# Patient Record
Sex: FEMALE | Race: WHITE | Employment: FULL TIME | URBAN - METROPOLITAN AREA
[De-identification: names, ages, dates, MRNs, and addresses within clinical notes are randomized per-mention and may not be internally consistent; named-entity substitution may affect disease eponyms.]

---

## 2018-05-02 LAB — CHLAMYDIA TRACHOMATIS AMPLIFIED DET: NEGATIVE

## 2018-06-01 LAB
ABO/RH: NORMAL
ANTIBODY SCREEN: NEGATIVE
HCT VFR BLD CALC: 38 % (ref 36–46)
HEMOGLOBIN: 13 G/DL (ref 12–16)
HEPATITIS B SURFACE ANTIGEN INTERPRETATION: NEGATIVE
HIV-1 AND HIV-2 ANTIBODIES: NEGATIVE
RPR: NON REACTIVE
RUBELLA ANTIBODY IGG: NORMAL

## 2018-09-19 LAB
GLUCOSE CHALLENGE: 76
HCT: 38.8 %
HGB: 13.1

## 2018-12-20 ENCOUNTER — ANESTHESIA (OUTPATIENT)
Dept: LABOR AND DELIVERY | Age: 28
End: 2018-12-20
Payer: COMMERCIAL

## 2018-12-20 ENCOUNTER — HOSPITAL ENCOUNTER (INPATIENT)
Age: 28
LOS: 2 days | Discharge: HOME | End: 2018-12-22
Attending: OBSTETRICS & GYNECOLOGY | Admitting: OBSTETRICS & GYNECOLOGY
Payer: COMMERCIAL

## 2018-12-20 ENCOUNTER — ANESTHESIA EVENT (OUTPATIENT)
Dept: LABOR AND DELIVERY | Age: 28
End: 2018-12-20
Payer: COMMERCIAL

## 2018-12-20 PROBLEM — O47.9 THREATENED LABOR: Status: ACTIVE | Noted: 2018-12-20

## 2018-12-20 PROBLEM — Z37.9 NORMAL LABOR: Status: ACTIVE | Noted: 2018-12-20

## 2018-12-20 LAB
ABO/RH: NORMAL
AMPHETAMINE SCREEN, URINE: NORMAL
ANTIBODY SCREEN: NORMAL
BARBITURATE SCREEN URINE: NORMAL
BASOPHILS ABSOLUTE: 0.1 K/UL (ref 0–0.2)
BASOPHILS RELATIVE PERCENT: 0.3 %
BENZODIAZEPINE SCREEN, URINE: NORMAL
BUPRENORPHINE URINE: NORMAL
CANNABINOID SCREEN URINE: NORMAL
COCAINE METABOLITE SCREEN URINE: NORMAL
EOSINOPHILS ABSOLUTE: 0 K/UL (ref 0–0.6)
EOSINOPHILS RELATIVE PERCENT: 0.1 %
HCT VFR BLD CALC: 40.4 % (ref 36–48)
HEMOGLOBIN: 13.5 G/DL (ref 12–16)
LYMPHOCYTES ABSOLUTE: 1.3 K/UL (ref 1–5.1)
LYMPHOCYTES RELATIVE PERCENT: 8.7 %
Lab: NORMAL
MCH RBC QN AUTO: 28.8 PG (ref 26–34)
MCHC RBC AUTO-ENTMCNC: 33.4 G/DL (ref 31–36)
MCV RBC AUTO: 86.1 FL (ref 80–100)
METHADONE SCREEN, URINE: NORMAL
MONOCYTES ABSOLUTE: 0.9 K/UL (ref 0–1.3)
MONOCYTES RELATIVE PERCENT: 5.7 %
NEUTROPHILS ABSOLUTE: 12.8 K/UL (ref 1.7–7.7)
NEUTROPHILS RELATIVE PERCENT: 85.2 %
OPIATE SCREEN URINE: NORMAL
OXYCODONE URINE: NORMAL
PDW BLD-RTO: 14 % (ref 12.4–15.4)
PH UA: 7
PHENCYCLIDINE SCREEN URINE: NORMAL
PLATELET # BLD: 197 K/UL (ref 135–450)
PMV BLD AUTO: 8.3 FL (ref 5–10.5)
PROPOXYPHENE SCREEN: NORMAL
RBC # BLD: 4.69 M/UL (ref 4–5.2)
WBC # BLD: 15 K/UL (ref 4–11)

## 2018-12-20 PROCEDURE — 86780 TREPONEMA PALLIDUM: CPT

## 2018-12-20 PROCEDURE — 2580000003 HC RX 258: Performed by: ADVANCED PRACTICE MIDWIFE

## 2018-12-20 PROCEDURE — 85025 COMPLETE CBC W/AUTO DIFF WBC: CPT

## 2018-12-20 PROCEDURE — 1220000000 HC SEMI PRIVATE OB R&B

## 2018-12-20 PROCEDURE — 86850 RBC ANTIBODY SCREEN: CPT

## 2018-12-20 PROCEDURE — 7200000001 HC VAGINAL DELIVERY

## 2018-12-20 PROCEDURE — 6370000000 HC RX 637 (ALT 250 FOR IP): Performed by: ADVANCED PRACTICE MIDWIFE

## 2018-12-20 PROCEDURE — 2580000003 HC RX 258

## 2018-12-20 PROCEDURE — 2500000003 HC RX 250 WO HCPCS: Performed by: NURSE ANESTHETIST, CERTIFIED REGISTERED

## 2018-12-20 PROCEDURE — 86900 BLOOD TYPING SEROLOGIC ABO: CPT

## 2018-12-20 PROCEDURE — 3700000025 ANESTHESIA EPIDURAL BLOCK: Performed by: ANESTHESIOLOGY

## 2018-12-20 PROCEDURE — 51701 INSERT BLADDER CATHETER: CPT

## 2018-12-20 PROCEDURE — 80307 DRUG TEST PRSMV CHEM ANLYZR: CPT

## 2018-12-20 PROCEDURE — 86901 BLOOD TYPING SEROLOGIC RH(D): CPT

## 2018-12-20 RX ORDER — ONDANSETRON 2 MG/ML
4 INJECTION INTRAMUSCULAR; INTRAVENOUS EVERY 6 HOURS PRN
Status: DISCONTINUED | OUTPATIENT
Start: 2018-12-20 | End: 2018-12-20

## 2018-12-20 RX ORDER — SODIUM CHLORIDE, SODIUM LACTATE, POTASSIUM CHLORIDE, CALCIUM CHLORIDE 600; 310; 30; 20 MG/100ML; MG/100ML; MG/100ML; MG/100ML
INJECTION, SOLUTION INTRAVENOUS CONTINUOUS
Status: DISCONTINUED | OUTPATIENT
Start: 2018-12-20 | End: 2018-12-20

## 2018-12-20 RX ORDER — BUPIVACAINE HYDROCHLORIDE 5 MG/ML
INJECTION, SOLUTION EPIDURAL; INTRACAUDAL PRN
Status: DISCONTINUED | OUTPATIENT
Start: 2018-12-20 | End: 2018-12-20 | Stop reason: SDUPTHER

## 2018-12-20 RX ORDER — ACETAMINOPHEN 325 MG/1
650 TABLET ORAL EVERY 4 HOURS PRN
Status: DISCONTINUED | OUTPATIENT
Start: 2018-12-20 | End: 2018-12-20

## 2018-12-20 RX ORDER — BUPIVACAINE HYDROCHLORIDE 2.5 MG/ML
INJECTION, SOLUTION EPIDURAL; INFILTRATION; INTRACAUDAL PRN
Status: DISCONTINUED | OUTPATIENT
Start: 2018-12-20 | End: 2018-12-20 | Stop reason: SDUPTHER

## 2018-12-20 RX ORDER — SODIUM CHLORIDE 0.9 % (FLUSH) 0.9 %
10 SYRINGE (ML) INJECTION PRN
Status: DISCONTINUED | OUTPATIENT
Start: 2018-12-20 | End: 2018-12-22 | Stop reason: HOSPADM

## 2018-12-20 RX ORDER — HYDROCODONE BITARTRATE AND ACETAMINOPHEN 5; 325 MG/1; MG/1
1 TABLET ORAL EVERY 6 HOURS PRN
Status: DISCONTINUED | OUTPATIENT
Start: 2018-12-20 | End: 2018-12-22 | Stop reason: HOSPADM

## 2018-12-20 RX ORDER — LIDOCAINE HYDROCHLORIDE 10 MG/ML
30 INJECTION, SOLUTION EPIDURAL; INFILTRATION; INTRACAUDAL; PERINEURAL PRN
Status: DISCONTINUED | OUTPATIENT
Start: 2018-12-20 | End: 2018-12-20

## 2018-12-20 RX ORDER — DOCUSATE SODIUM 100 MG/1
100 CAPSULE, LIQUID FILLED ORAL 2 TIMES DAILY
Status: DISCONTINUED | OUTPATIENT
Start: 2018-12-20 | End: 2018-12-22 | Stop reason: HOSPADM

## 2018-12-20 RX ORDER — SODIUM CHLORIDE 0.9 % (FLUSH) 0.9 %
10 SYRINGE (ML) INJECTION EVERY 12 HOURS SCHEDULED
Status: DISCONTINUED | OUTPATIENT
Start: 2018-12-20 | End: 2018-12-20

## 2018-12-20 RX ORDER — DIPHENHYDRAMINE HCL 25 MG
25 TABLET ORAL EVERY 4 HOURS PRN
Status: DISCONTINUED | OUTPATIENT
Start: 2018-12-20 | End: 2018-12-20

## 2018-12-20 RX ORDER — IBUPROFEN 800 MG/1
800 TABLET ORAL EVERY 6 HOURS PRN
Status: DISCONTINUED | OUTPATIENT
Start: 2018-12-20 | End: 2018-12-22 | Stop reason: HOSPADM

## 2018-12-20 RX ORDER — SODIUM CHLORIDE 0.9 % (FLUSH) 0.9 %
10 SYRINGE (ML) INJECTION EVERY 12 HOURS SCHEDULED
Status: DISCONTINUED | OUTPATIENT
Start: 2018-12-20 | End: 2018-12-22 | Stop reason: HOSPADM

## 2018-12-20 RX ORDER — SODIUM CHLORIDE, SODIUM LACTATE, POTASSIUM CHLORIDE, CALCIUM CHLORIDE 600; 310; 30; 20 MG/100ML; MG/100ML; MG/100ML; MG/100ML
INJECTION, SOLUTION INTRAVENOUS
Status: COMPLETED
Start: 2018-12-20 | End: 2018-12-20

## 2018-12-20 RX ORDER — ACETAMINOPHEN 325 MG/1
650 TABLET ORAL EVERY 4 HOURS PRN
Status: DISCONTINUED | OUTPATIENT
Start: 2018-12-20 | End: 2018-12-22 | Stop reason: HOSPADM

## 2018-12-20 RX ORDER — SODIUM CHLORIDE 0.9 % (FLUSH) 0.9 %
10 SYRINGE (ML) INJECTION PRN
Status: DISCONTINUED | OUTPATIENT
Start: 2018-12-20 | End: 2018-12-20

## 2018-12-20 RX ORDER — ACETAMINOPHEN 325 MG/1
650 TABLET ORAL EVERY 6 HOURS PRN
COMMUNITY

## 2018-12-20 RX ADMIN — BENZOCAINE AND LEVOMENTHOL: 200; 5 SPRAY TOPICAL at 20:10

## 2018-12-20 RX ADMIN — BUPIVACAINE HYDROCHLORIDE 3 ML: 2.5 INJECTION, SOLUTION EPIDURAL; INFILTRATION; INTRACAUDAL; PERINEURAL at 11:37

## 2018-12-20 RX ADMIN — IBUPROFEN 800 MG: 800 TABLET ORAL at 20:10

## 2018-12-20 RX ADMIN — WITCH HAZEL 40 EACH: 500 SOLUTION RECTAL; TOPICAL at 20:10

## 2018-12-20 RX ADMIN — BUPIVACAINE HYDROCHLORIDE 3 ML: 5 INJECTION, SOLUTION EPIDURAL; INTRACAUDAL; PERINEURAL at 11:37

## 2018-12-20 RX ADMIN — SODIUM CHLORIDE, POTASSIUM CHLORIDE, SODIUM LACTATE AND CALCIUM CHLORIDE: 600; 310; 30; 20 INJECTION, SOLUTION INTRAVENOUS at 11:30

## 2018-12-20 RX ADMIN — HYDROCODONE BITARTRATE AND ACETAMINOPHEN 1 TABLET: 5; 325 TABLET ORAL at 22:15

## 2018-12-20 RX ADMIN — Medication 15 ML/HR: at 11:43

## 2018-12-20 RX ADMIN — SODIUM CHLORIDE, POTASSIUM CHLORIDE, SODIUM LACTATE AND CALCIUM CHLORIDE 1000 ML: 600; 310; 30; 20 INJECTION, SOLUTION INTRAVENOUS at 10:30

## 2018-12-20 RX ADMIN — DOCUSATE SODIUM 100 MG: 100 CAPSULE, LIQUID FILLED ORAL at 20:10

## 2018-12-20 ASSESSMENT — PAIN SCALES - GENERAL
PAINLEVEL_OUTOF10: 5
PAINLEVEL_OUTOF10: 6

## 2018-12-20 NOTE — H&P
Department of Obstetrics and Gynecology  [facility] Obstetrics History and Physical        CHIEF COMPLAINT:  contractions    HISTORY OF PRESENT ILLNESS:      The patient is a   1 parity 0 at 38.5 weeks. Patient presents with a chief complaint as above and is being admitted for active phase labor.  present. Requested epidural and just received with relief. DATES:    Estimated Due Date:  18    PRENATAL CARE:    Provider:  Ayah PLUNKETT    Group B Strep:  neg      Past Gynecological History:      See prenatal    Past Medical History:    History reviewed. No pertinent past medical history.   Past Surgical History:        Procedure Laterality Date    SINUS SURGERY      TONSILLECTOMY       Social History:      Family History:   Family History   Problem Relation Age of Onset    Heart Disease Mother     High Cholesterol Mother     Hearing Loss Maternal Grandmother     High Cholesterol Maternal Grandmother     Heart Disease Maternal Grandmother     Cancer Paternal Grandmother         ovarian     Medications Prior to Admission:    Allergies:  NKA    PHYSICAL EXAM:    General appearance:  Awake, alert  Neurologic:  oriented  Lungs:  Breathing unlabored  Heart:    Abdomen:  gravid  Fetal heart rate:  Baseline Heart Rate 135, accelerations:  present  Pelvis:  adequate  Cervix:    DILATION:  5 cm  EFFACEMENT:   100%  STATION:  0 cm  CONSISTENCY:  soft  POSITION:  anterior      Contraction frequency:  2-3 minutes  Membranes:  Intact    ASSESSMENT AND PLAN:    A: 34 yo   1 parity 0 at 38.5 weeks   Active labor   FHR category 1    P: Expectant management   Dr. Goodson Proffer aware   Reevaluate pedro luis Khanna

## 2018-12-20 NOTE — ANESTHESIA PRE PROCEDURE
Facility-Administered Medications Ordered in Other Encounters   Medication Dose Route Frequency Provider Last Rate Last Dose    bupivacaine (PF) (MARCAINE) 0.25 % injection    PRN Mauro Stoddard APRN - CRNA   3 mL at 12/20/18 1137    bupivacaine (PF) (MARCAINE) 0.5 % injection    PRN Mauro Stoddard APRN - CRNA   3 mL at 12/20/18 1137    sodium chloride 0.9 % 200 mL with fentaNYL 500 mcg, bupivacaine 0.5% 50 mL (OB) epidural    Continuous PRN Mauro Stoddard APRN - CRNA 15 mL/hr at 12/20/18 1143 15 mL/hr at 12/20/18 1143       Allergies:  No Known Allergies    Problem List:    Patient Active Problem List   Diagnosis Code    Threatened labor O47.9       Past Medical History:  History reviewed. No pertinent past medical history. Past Surgical History:        Procedure Laterality Date    SINUS SURGERY      TONSILLECTOMY         Social History:    Social History   Substance Use Topics    Smoking status: Never Smoker    Smokeless tobacco: Never Used    Alcohol use No                                Counseling given: Not Answered      Vital Signs (Current):   Vitals:    12/20/18 1138 12/20/18 1140 12/20/18 1143 12/20/18 1147   BP: 117/68 124/77 114/66 109/65   Pulse: 74 88 75 87   Temp:       TempSrc:       Weight:       Height:                                                  BP Readings from Last 3 Encounters:   12/20/18 109/65   12/10/14 115/79   10/05/11 133/79       NPO Status: Time of last liquid consumption: 0900 (water)                        Time of last solid consumption: 2200 (toast)                        Date of last liquid consumption: 12/20/18                        Date of last solid food consumption: 12/19/18    BMI:   Wt Readings from Last 3 Encounters:   12/20/18 191 lb (86.6 kg)   12/10/14 150 lb (68 kg)   10/05/11 145 lb (65.8 kg)     Body mass index is 31.78 kg/m².     CBC:   Lab Results   Component Value Date    WBC 15.0 12/20/2018    RBC 4.69 12/20/2018    HGB 13.5 12/20/2018

## 2018-12-21 LAB
BASOPHILS ABSOLUTE: 0 K/UL (ref 0–0.2)
BASOPHILS RELATIVE PERCENT: 0.2 %
EOSINOPHILS ABSOLUTE: 0.1 K/UL (ref 0–0.6)
EOSINOPHILS RELATIVE PERCENT: 0.5 %
HCT VFR BLD CALC: 33.5 % (ref 36–48)
HEMOGLOBIN: 11.2 G/DL (ref 12–16)
LYMPHOCYTES ABSOLUTE: 1.6 K/UL (ref 1–5.1)
LYMPHOCYTES RELATIVE PERCENT: 10.2 %
MCH RBC QN AUTO: 29 PG (ref 26–34)
MCHC RBC AUTO-ENTMCNC: 33.5 G/DL (ref 31–36)
MCV RBC AUTO: 86.7 FL (ref 80–100)
MONOCYTES ABSOLUTE: 0.9 K/UL (ref 0–1.3)
MONOCYTES RELATIVE PERCENT: 5.4 %
NEUTROPHILS ABSOLUTE: 13.5 K/UL (ref 1.7–7.7)
NEUTROPHILS RELATIVE PERCENT: 83.7 %
PDW BLD-RTO: 14.2 % (ref 12.4–15.4)
PLATELET # BLD: 195 K/UL (ref 135–450)
PMV BLD AUTO: 7.9 FL (ref 5–10.5)
RBC # BLD: 3.87 M/UL (ref 4–5.2)
TOTAL SYPHILLIS IGG/IGM: NORMAL
WBC # BLD: 16.2 K/UL (ref 4–11)

## 2018-12-21 PROCEDURE — 36415 COLL VENOUS BLD VENIPUNCTURE: CPT

## 2018-12-21 PROCEDURE — 85025 COMPLETE CBC W/AUTO DIFF WBC: CPT

## 2018-12-21 PROCEDURE — 6370000000 HC RX 637 (ALT 250 FOR IP): Performed by: ADVANCED PRACTICE MIDWIFE

## 2018-12-21 PROCEDURE — 1220000000 HC SEMI PRIVATE OB R&B

## 2018-12-21 RX ADMIN — DOCUSATE SODIUM 100 MG: 100 CAPSULE, LIQUID FILLED ORAL at 09:36

## 2018-12-21 RX ADMIN — DOCUSATE SODIUM 100 MG: 100 CAPSULE, LIQUID FILLED ORAL at 21:52

## 2018-12-21 RX ADMIN — IBUPROFEN 800 MG: 800 TABLET ORAL at 04:28

## 2018-12-21 RX ADMIN — IBUPROFEN 800 MG: 800 TABLET ORAL at 11:49

## 2018-12-21 RX ADMIN — HYDROCODONE BITARTRATE AND ACETAMINOPHEN 1 TABLET: 5; 325 TABLET ORAL at 21:39

## 2018-12-21 RX ADMIN — HYDROCODONE BITARTRATE AND ACETAMINOPHEN 1 TABLET: 5; 325 TABLET ORAL at 09:36

## 2018-12-21 RX ADMIN — IBUPROFEN 800 MG: 800 TABLET ORAL at 18:04

## 2018-12-21 RX ADMIN — HYDROCODONE BITARTRATE AND ACETAMINOPHEN 1 TABLET: 5; 325 TABLET ORAL at 15:12

## 2018-12-21 ASSESSMENT — PAIN SCALES - GENERAL
PAINLEVEL_OUTOF10: 3
PAINLEVEL_OUTOF10: 6
PAINLEVEL_OUTOF10: 4
PAINLEVEL_OUTOF10: 6
PAINLEVEL_OUTOF10: 7
PAINLEVEL_OUTOF10: 7

## 2018-12-21 NOTE — L&D DELIVERY SUMMARY NOTE
and grandmother entered the postpartum period in  excellent condition. Dr. Lesli Tinajero notified of delivery.         Casey Garcia CNM    D: 12/20/2018 18:22:49       T: 12/20/2018 19:49:32     [email or web address]  Job#: [id number]     Doc#: [id number]    CC:

## 2018-12-21 NOTE — PLAN OF CARE
Problem: VAGINAL DELIVERY - RECOVERY AND POST PARTUM  Goal: Vital signs are medically acceptable  Outcome: Ongoing    Goal: Patient will remain free of falls  Outcome: Ongoing    Goal: Fundus firm at midline  Outcome: Ongoing    Goal: Moderate rubra without clots, no purulent discharge, no foul smelling lochia  Outcome: Ongoing    Goal: Empties bladder  Outcome: Ongoing    Goal: Verbalizes understanding of normal bowel function resumption  Outcome: Ongoing    Goal: Edema will be absent or minimal  Outcome: Ongoing    Goal: Breasts are soft with nipple integrity intact  Outcome: Ongoing    Goal: Demonstrates appropriate breast feeding techniques  Outcome: Ongoing    Goal: Appropriate behavior observed  Outcome: Ongoing    Goal: Positive Mother-Baby interactions are observed  Outcome: Ongoing    Goal: Perineum intact without discharge or hematoma  Outcome: Ongoing    Goal: Ambulates independently  Outcome: Ongoing      Problem: PAIN  Goal: Patient's pain/discomfort is manageable  Outcome: Ongoing      Problem: KNOWLEDGE DEFICIT  Goal: Patient/S.O. demonstrates understanding of disease process, treatment plan, medications, and discharge instructions.   Outcome: Ongoing

## 2018-12-21 NOTE — ANESTHESIA POSTPROCEDURE EVALUATION
Department of Anesthesiology  Postprocedure Note    Patient: Arlin Gutierrez  MRN: [id number]  YOB: 1990  Date of evaluation: 12/21/2018  Time:  7:50 AM     Procedure Summary     Date:  12/20/18 Room / Location:      Anesthesia Start:  1121 Anesthesia Stop:  1744    Procedure:  ANESTHESIA LABOR ANALGESIA Diagnosis:      Scheduled Providers:   Responsible Provider:  Evon Marmolejo MD    Anesthesia Type:  epidural ASA Status:  2 - Emergent          Anesthesia Type: No value filed. Acacia Phase I: Acacia Score: 10    Acacia Phase II: Acacia Score: 10    Last vitals: Reviewed and per EMR flowsheets. Anesthesia Post Evaluation    Level of consciousness: awake and alert  Nausea & Vomiting: no nausea and no vomiting  Complications: no  Cardiovascular status: hemodynamically stable  Respiratory status: acceptable and room air  Hydration status: stable  Comments: Patient is s/p vaginal delivery with epidural analgesia. No apparent or reported anesthesia complications thus far.

## 2018-12-22 VITALS
HEART RATE: 68 BPM | HEIGHT: 65 IN | DIASTOLIC BLOOD PRESSURE: 70 MMHG | WEIGHT: 191 LBS | BODY MASS INDEX: 31.82 KG/M2 | RESPIRATION RATE: 16 BRPM | TEMPERATURE: 98.2 F | SYSTOLIC BLOOD PRESSURE: 105 MMHG

## 2018-12-22 PROBLEM — O47.9 THREATENED LABOR: Status: RESOLVED | Noted: 2018-12-20 | Resolved: 2018-12-22

## 2018-12-22 PROBLEM — Z37.9 NORMAL LABOR: Status: RESOLVED | Noted: 2018-12-20 | Resolved: 2018-12-22

## 2018-12-22 PROCEDURE — 6370000000 HC RX 637 (ALT 250 FOR IP): Performed by: ADVANCED PRACTICE MIDWIFE

## 2018-12-22 RX ORDER — PSEUDOEPHEDRINE HCL 30 MG
100 TABLET ORAL 2 TIMES DAILY
Status: ON HOLD | COMMUNITY
Start: 2018-12-22 | End: 2020-01-10 | Stop reason: HOSPADM

## 2018-12-22 RX ORDER — HYDROCODONE BITARTRATE AND ACETAMINOPHEN 5; 325 MG/1; MG/1
1 TABLET ORAL EVERY 6 HOURS PRN
Qty: 15 TABLET | Refills: 0 | Status: SHIPPED | OUTPATIENT
Start: 2018-12-22 | End: 2018-12-25

## 2018-12-22 RX ORDER — IBUPROFEN 800 MG/1
800 TABLET ORAL EVERY 6 HOURS PRN
Qty: 60 TABLET | Refills: 0 | Status: ON HOLD | OUTPATIENT
Start: 2018-12-22 | End: 2020-01-10 | Stop reason: HOSPADM

## 2018-12-22 RX ADMIN — HYDROCODONE BITARTRATE AND ACETAMINOPHEN 1 TABLET: 5; 325 TABLET ORAL at 04:13

## 2018-12-22 RX ADMIN — IBUPROFEN 800 MG: 800 TABLET ORAL at 00:48

## 2018-12-22 RX ADMIN — WITCH HAZEL 40 EACH: 500 SOLUTION RECTAL; TOPICAL at 00:48

## 2018-12-22 RX ADMIN — IBUPROFEN 800 MG: 800 TABLET ORAL at 07:26

## 2018-12-22 RX ADMIN — DOCUSATE SODIUM 100 MG: 100 CAPSULE, LIQUID FILLED ORAL at 07:26

## 2018-12-22 RX ADMIN — HYDROCODONE BITARTRATE AND ACETAMINOPHEN 1 TABLET: 5; 325 TABLET ORAL at 10:57

## 2018-12-22 ASSESSMENT — PAIN SCALES - GENERAL
PAINLEVEL_OUTOF10: 6
PAINLEVEL_OUTOF10: 7
PAINLEVEL_OUTOF10: 4
PAINLEVEL_OUTOF10: 7

## 2018-12-22 NOTE — LACTATION NOTE
This note was copied from a baby's chart. Introduced self to patient as Lactation RN, name and phone number written on white board in room. Assisted mother with latching infant to left breast in cross-cradle hold, emphasized the importance of positioning and obtaining a deep latch. Infant observed with HENOK, SRS and AS. Breastfeeding education initiated. Reviewed with mother what to expect over the next  24-48 hours with infant feedings, infant output, and breast care. Educated mother on how to hand express colostrum. Reviewed infant feeding cues and encouraged mother to allow infant to breast feed on demand, a minimum of 8-12 times a day after the first day of life. Also encouraged mother to avoid giving infant a pacifier or bottle for at least the first two weeks of life. Binder and breast feeding log reviewed, all questions answered. Mother instructed to call Lactation nurse for F/U care as needed.
This note was copied from a baby's chart. Lactation Progress Note      Data:     Primip breast feeder requests f/u to assess latch. C/o sore nipples. Observed positioning baby to the breast, baby in chin to chest balled up position in football hold, father holding mom's breast and assisting with latching baby on. Action: Assisted with repositioning of pillows and baby to the breast. Offering more pillows for additional support of LGA baby. Reviewed rationale of baby's position, encouraging belly to belly and nose to nipple position. Shown tips for mom to provide breast support easily independently encouraging U or C shape hold on the breast while baby in football positioning. Shown mom where to hold baby supporting baby's neck, allowing the forehead to lag back and chin to latch deeply on the breast. Shown where baby should latch on to areola and breast for deep asymmetric latch. Encouraged to wait for baby to open mouth widely before bringing baby deeply onto the breast. Gave tips to encourage wide open mouth and deep latch. HENOK observed with SRS and AS. Pt confirms much relief that latch and position is much more comfortable and easier to do independently without dad's help. Good feeding observed. Baby asleep and releases the breast. Nipple remains round without blanching, creasing, or redness. Shown to mom, giving further reassurance of good latch. Educated on signs of good latch, vs shallow latch and how to break latch, encouraging to do so as needed. Discussed that baby will likely cluster feed later this evening, educating on what to expect. Encouraged parents to rest while baby is sleeping. Educated on importance and normalcy of cluster feeding, and tips to encourage breastfeeding success and rest when able. Encouraged to call for f/u support and assistance with latching/breastfeeding as needed. Lanolin and hydrogels pads provided, instructed on care of sore nipples.      Response: Verbalized
This note was copied from a baby's chart. Lactation Progress Note      Data:    Mother called because she was having trouble getting infant latched. Action: Assisted mother with getting infant placed in football hold and latched. Infant is large and mother is having trouble with keeping a good hold of him and getting him to latch. Once latched, infant does well with maintaining the latch. Worked with mother on providing good breast and head support. Encouraged mother to call for f/u assistance. Response: Mother was happy that infant had a good feeding.
has [facility] f/u outpatient clinic if needed as well.

## 2018-12-22 NOTE — PROGRESS NOTES
Department of Obstetrics and Gynecology  Labor and Delivery  Attending Post Partum Progress Note      SUBJECTIVE:  Feels well and ready for discharge today. Minimal perineal pain and moderate pain in coccyx area. Pain is well controlled w/ comfort measures and pain meds. Tolerating regular diet, ambulating well, voiding qs. Baby is nursing well. OBJECTIVE:      Vitals:  /70   Pulse 68   Temp 98.2 °F (36.8 °C) (Oral)   Resp 16   Ht 5' 5\" (1.651 m)   Wt 191 lb (86.6 kg)   LMP 03/24/2018   Breastfeeding?  Unknown   BMI 31.78 kg/m²     ABDOMEN:  soft and non-tender, [email or web address]  GENITAL/URINARY:  SLR, sutures intact, no edema/bruising  LE: No evidence of DVT    DATA:    CBC:    Lab Results   Component Value Date    WBC 16.2 12/21/2018    RBC 3.87 12/21/2018    HGB 11.2 12/21/2018    HCT 33.5 12/21/2018    MCV 86.7 12/21/2018    RDW 14.2 12/21/2018     12/21/2018       ASSESSMENT & PLAN:    Primp s/p vaginal delivery   Leukocytosis w/o fever  Stable breastfeeding infant  Discharge to home today  Rx motrin, norco, continue PNV and colace  Reviewed discharge instructions, warning s/s to call for and bleeding precautions  F/U in 6 wks

## 2019-05-28 LAB
C. TRACHOMATIS, EXTERNAL RESULT: NEGATIVE
N. GONORRHOEAE, EXTERNAL RESULT: NEGATIVE

## 2019-06-19 LAB
ABO, EXTERNAL RESULT: NORMAL
HEP B, EXTERNAL RESULT: NEGATIVE
HIV, EXTERNAL RESULT: NEGATIVE
RH FACTOR, EXTERNAL RESULT: POSITIVE
RPR, EXTERNAL RESULT: NON REACTIVE
RUBELLA TITER, EXTERNAL RESULT: NORMAL

## 2019-12-23 LAB — GBS, EXTERNAL RESULT: NEGATIVE

## 2020-01-08 ENCOUNTER — HOSPITAL ENCOUNTER (INPATIENT)
Age: 30
LOS: 2 days | Discharge: HOME | End: 2020-01-10
Attending: OBSTETRICS & GYNECOLOGY | Admitting: OBSTETRICS & GYNECOLOGY
Payer: COMMERCIAL

## 2020-01-08 ENCOUNTER — ANESTHESIA EVENT (OUTPATIENT)
Dept: LABOR AND DELIVERY | Age: 30
End: 2020-01-08
Payer: COMMERCIAL

## 2020-01-08 ENCOUNTER — ANESTHESIA (OUTPATIENT)
Dept: LABOR AND DELIVERY | Age: 30
End: 2020-01-08
Payer: COMMERCIAL

## 2020-01-08 PROBLEM — Z37.9 NORMAL LABOR: Status: ACTIVE | Noted: 2020-01-08

## 2020-01-08 LAB
AMPHETAMINE SCREEN, URINE: NORMAL
BARBITURATE SCREEN URINE: NORMAL
BENZODIAZEPINE SCREEN, URINE: NORMAL
BUPRENORPHINE URINE: NORMAL
CANNABINOID SCREEN URINE: NORMAL
COCAINE METABOLITE SCREEN URINE: NORMAL
HCT VFR BLD CALC: 41.1 % (ref 36–48)
HEMOGLOBIN: 13.5 G/DL (ref 12–16)
Lab: NORMAL
MCH RBC QN AUTO: 27.5 PG (ref 26–34)
MCHC RBC AUTO-ENTMCNC: 32.8 G/DL (ref 31–36)
MCV RBC AUTO: 83.8 FL (ref 80–100)
METHADONE SCREEN, URINE: NORMAL
OPIATE SCREEN URINE: NORMAL
OXYCODONE URINE: NORMAL
PDW BLD-RTO: 14 % (ref 12.4–15.4)
PH UA: 6
PHENCYCLIDINE SCREEN URINE: NORMAL
PLATELET # BLD: 281 K/UL (ref 135–450)
PMV BLD AUTO: 8.8 FL (ref 5–10.5)
PROPOXYPHENE SCREEN: NORMAL
RBC # BLD: 4.9 M/UL (ref 4–5.2)
WBC # BLD: 16.6 K/UL (ref 4–11)

## 2020-01-08 PROCEDURE — 51701 INSERT BLADDER CATHETER: CPT

## 2020-01-08 PROCEDURE — 86780 TREPONEMA PALLIDUM: CPT

## 2020-01-08 PROCEDURE — 3700000025 EPIDURAL BLOCK: Performed by: ANESTHESIOLOGY

## 2020-01-08 PROCEDURE — 1220000000 HC SEMI PRIVATE OB R&B

## 2020-01-08 PROCEDURE — 2500000003 HC RX 250 WO HCPCS: Performed by: NURSE ANESTHETIST, CERTIFIED REGISTERED

## 2020-01-08 PROCEDURE — 80307 DRUG TEST PRSMV CHEM ANLYZR: CPT

## 2020-01-08 PROCEDURE — 85027 COMPLETE CBC AUTOMATED: CPT

## 2020-01-08 RX ORDER — SODIUM CHLORIDE, SODIUM LACTATE, POTASSIUM CHLORIDE, CALCIUM CHLORIDE 600; 310; 30; 20 MG/100ML; MG/100ML; MG/100ML; MG/100ML
INJECTION, SOLUTION INTRAVENOUS CONTINUOUS
Status: DISCONTINUED | OUTPATIENT
Start: 2020-01-08 | End: 2020-01-08

## 2020-01-08 RX ORDER — SODIUM CHLORIDE 0.9 % (FLUSH) 0.9 %
10 SYRINGE (ML) INJECTION PRN
Status: DISCONTINUED | OUTPATIENT
Start: 2020-01-08 | End: 2020-01-10 | Stop reason: HOSPADM

## 2020-01-08 RX ORDER — SODIUM CHLORIDE 0.9 % (FLUSH) 0.9 %
10 SYRINGE (ML) INJECTION EVERY 12 HOURS SCHEDULED
Status: DISCONTINUED | OUTPATIENT
Start: 2020-01-08 | End: 2020-01-10 | Stop reason: HOSPADM

## 2020-01-08 RX ORDER — SODIUM CHLORIDE, SODIUM LACTATE, POTASSIUM CHLORIDE, CALCIUM CHLORIDE 600; 310; 30; 20 MG/100ML; MG/100ML; MG/100ML; MG/100ML
INJECTION, SOLUTION INTRAVENOUS
Status: DISPENSED
Start: 2020-01-08 | End: 2020-01-09

## 2020-01-08 RX ORDER — SODIUM CHLORIDE, SODIUM LACTATE, POTASSIUM CHLORIDE, CALCIUM CHLORIDE 600; 310; 30; 20 MG/100ML; MG/100ML; MG/100ML; MG/100ML
INJECTION, SOLUTION INTRAVENOUS CONTINUOUS
Status: DISCONTINUED | OUTPATIENT
Start: 2020-01-08 | End: 2020-01-10 | Stop reason: HOSPADM

## 2020-01-08 RX ORDER — ACETAMINOPHEN 325 MG/1
650 TABLET ORAL EVERY 4 HOURS PRN
Status: DISCONTINUED | OUTPATIENT
Start: 2020-01-08 | End: 2020-01-10 | Stop reason: HOSPADM

## 2020-01-08 RX ORDER — DOCUSATE SODIUM 100 MG/1
100 CAPSULE, LIQUID FILLED ORAL 2 TIMES DAILY
Status: DISCONTINUED | OUTPATIENT
Start: 2020-01-08 | End: 2020-01-10 | Stop reason: HOSPADM

## 2020-01-08 RX ORDER — ONDANSETRON 2 MG/ML
4 INJECTION INTRAMUSCULAR; INTRAVENOUS EVERY 6 HOURS PRN
Status: DISCONTINUED | OUTPATIENT
Start: 2020-01-08 | End: 2020-01-10 | Stop reason: HOSPADM

## 2020-01-08 RX ORDER — BUPIVACAINE HYDROCHLORIDE 2.5 MG/ML
INJECTION, SOLUTION EPIDURAL; INFILTRATION; INTRACAUDAL PRN
Status: DISCONTINUED | OUTPATIENT
Start: 2020-01-08 | End: 2020-01-09 | Stop reason: SDUPTHER

## 2020-01-08 RX ADMIN — BUPIVACAINE HYDROCHLORIDE 5 ML: 2.5 INJECTION, SOLUTION EPIDURAL; INFILTRATION; INTRACAUDAL; PERINEURAL at 22:06

## 2020-01-08 RX ADMIN — Medication 15 ML/HR: at 22:11

## 2020-01-09 LAB
SPECIMEN STATUS: NORMAL
TOTAL SYPHILLIS IGG/IGM: NORMAL

## 2020-01-09 PROCEDURE — 1220000000 HC SEMI PRIVATE OB R&B

## 2020-01-09 PROCEDURE — 6370000000 HC RX 637 (ALT 250 FOR IP): Performed by: ADVANCED PRACTICE MIDWIFE

## 2020-01-09 PROCEDURE — 7200000001 HC VAGINAL DELIVERY

## 2020-01-09 PROCEDURE — 0KQM0ZZ REPAIR PERINEUM MUSCLE, OPEN APPROACH: ICD-10-PCS | Performed by: OBSTETRICS & GYNECOLOGY

## 2020-01-09 PROCEDURE — 51701 INSERT BLADDER CATHETER: CPT

## 2020-01-09 PROCEDURE — 6370000000 HC RX 637 (ALT 250 FOR IP)

## 2020-01-09 RX ORDER — ACETAMINOPHEN 325 MG/1
650 TABLET ORAL EVERY 4 HOURS PRN
Status: DISCONTINUED | OUTPATIENT
Start: 2020-01-09 | End: 2020-01-10 | Stop reason: HOSPADM

## 2020-01-09 RX ORDER — SODIUM CHLORIDE 0.9 % (FLUSH) 0.9 %
10 SYRINGE (ML) INJECTION PRN
Status: DISCONTINUED | OUTPATIENT
Start: 2020-01-09 | End: 2020-01-10 | Stop reason: HOSPADM

## 2020-01-09 RX ORDER — IBUPROFEN 800 MG/1
TABLET ORAL
Status: COMPLETED
Start: 2020-01-09 | End: 2020-01-09

## 2020-01-09 RX ORDER — DOCUSATE SODIUM 100 MG/1
100 CAPSULE, LIQUID FILLED ORAL 2 TIMES DAILY
Status: DISCONTINUED | OUTPATIENT
Start: 2020-01-09 | End: 2020-01-10 | Stop reason: HOSPADM

## 2020-01-09 RX ORDER — HYDROCODONE BITARTRATE AND ACETAMINOPHEN 5; 325 MG/1; MG/1
1 TABLET ORAL EVERY 4 HOURS PRN
Status: DISCONTINUED | OUTPATIENT
Start: 2020-01-09 | End: 2020-01-10 | Stop reason: HOSPADM

## 2020-01-09 RX ORDER — SODIUM CHLORIDE, SODIUM LACTATE, POTASSIUM CHLORIDE, CALCIUM CHLORIDE 600; 310; 30; 20 MG/100ML; MG/100ML; MG/100ML; MG/100ML
INJECTION, SOLUTION INTRAVENOUS CONTINUOUS
Status: DISCONTINUED | OUTPATIENT
Start: 2020-01-09 | End: 2020-01-10 | Stop reason: HOSPADM

## 2020-01-09 RX ORDER — ONDANSETRON 2 MG/ML
4 INJECTION INTRAMUSCULAR; INTRAVENOUS EVERY 6 HOURS PRN
Status: DISCONTINUED | OUTPATIENT
Start: 2020-01-09 | End: 2020-01-10 | Stop reason: HOSPADM

## 2020-01-09 RX ORDER — SODIUM CHLORIDE 0.9 % (FLUSH) 0.9 %
10 SYRINGE (ML) INJECTION EVERY 12 HOURS SCHEDULED
Status: DISCONTINUED | OUTPATIENT
Start: 2020-01-09 | End: 2020-01-10 | Stop reason: HOSPADM

## 2020-01-09 RX ORDER — LANOLIN 100 %
OINTMENT (GRAM) TOPICAL PRN
Status: DISCONTINUED | OUTPATIENT
Start: 2020-01-09 | End: 2020-01-10 | Stop reason: HOSPADM

## 2020-01-09 RX ORDER — IBUPROFEN 800 MG/1
800 TABLET ORAL EVERY 8 HOURS
Status: DISCONTINUED | OUTPATIENT
Start: 2020-01-10 | End: 2020-01-09

## 2020-01-09 RX ORDER — HYDROCODONE BITARTRATE AND ACETAMINOPHEN 5; 325 MG/1; MG/1
2 TABLET ORAL EVERY 4 HOURS PRN
Status: DISCONTINUED | OUTPATIENT
Start: 2020-01-09 | End: 2020-01-10 | Stop reason: HOSPADM

## 2020-01-09 RX ORDER — IBUPROFEN 800 MG/1
800 TABLET ORAL EVERY 8 HOURS
Status: DISCONTINUED | OUTPATIENT
Start: 2020-01-09 | End: 2020-01-10 | Stop reason: HOSPADM

## 2020-01-09 RX ORDER — HYDROCODONE BITARTRATE AND ACETAMINOPHEN 5; 325 MG/1; MG/1
TABLET ORAL
Status: COMPLETED
Start: 2020-01-09 | End: 2020-01-10

## 2020-01-09 RX ADMIN — HYDROCODONE BITARTRATE AND ACETAMINOPHEN 2 TABLET: 5; 325 TABLET ORAL at 14:30

## 2020-01-09 RX ADMIN — DOCUSATE SODIUM 100 MG: 100 CAPSULE, LIQUID FILLED ORAL at 08:24

## 2020-01-09 RX ADMIN — IBUPROFEN 800 MG: 800 TABLET, FILM COATED ORAL at 08:23

## 2020-01-09 RX ADMIN — HYDROCODONE BITARTRATE AND ACETAMINOPHEN 2 TABLET: 5; 325 TABLET ORAL at 18:31

## 2020-01-09 RX ADMIN — IBUPROFEN 800 MG: 800 TABLET, FILM COATED ORAL at 18:32

## 2020-01-09 RX ADMIN — HYDROCODONE BITARTRATE AND ACETAMINOPHEN 2 TABLET: 5; 325 TABLET ORAL at 22:52

## 2020-01-09 RX ADMIN — IBUPROFEN 800 MG: 800 TABLET ORAL at 18:32

## 2020-01-09 RX ADMIN — DOCUSATE SODIUM 100 MG: 100 CAPSULE, LIQUID FILLED ORAL at 21:33

## 2020-01-09 ASSESSMENT — PAIN SCALES - GENERAL
PAINLEVEL_OUTOF10: 9
PAINLEVEL_OUTOF10: 7
PAINLEVEL_OUTOF10: 8
PAINLEVEL_OUTOF10: 7
PAINLEVEL_OUTOF10: 7

## 2020-01-09 NOTE — PLAN OF CARE
Problem: VAGINAL DELIVERY - RECOVERY AND POST PARTUM  Goal: Vital signs are medically acceptable  1/9/2020 0745 by Natalee Segovia RN  Outcome: Ongoing  1/9/2020 0354 by Sal Lomax RN  Outcome: Ongoing  Goal: Patient will remain free of falls  1/9/2020 0745 by Natalee Segovia RN  Outcome: Ongoing  1/9/2020 0354 by Sal Lomax RN  Outcome: Ongoing  Goal: Fundus firm at midline  1/9/2020 0745 by Natalee Segovia RN  Outcome: Ongoing  1/9/2020 0354 by Sal Lomax RN  Outcome: Ongoing  Goal: Moderate rubra without clots, no purulent discharge, no foul smelling lochia  1/9/2020 0745 by Natalee Segovia RN  Outcome: Ongoing  1/9/2020 0354 by Sal Lomax RN  Outcome: Ongoing  Goal: Empties bladder  1/9/2020 0745 by Natalee Segovia RN  Outcome: Ongoing  1/9/2020 0354 by Sal Lomax RN  Outcome: Ongoing  Goal: Verbalizes understanding of normal bowel function resumption  1/9/2020 0745 by Natalee Segovia RN  Outcome: Ongoing  1/9/2020 0354 by Sal Lomax RN  Outcome: Ongoing  Goal: Edema will be absent or minimal  1/9/2020 0745 by Natalee Segovia RN  Outcome: Ongoing  1/9/2020 0354 by Sal Lomax RN  Outcome: Ongoing  Goal: Breasts are soft with nipple integrity intact  1/9/2020 0745 by Natalee Segovia RN  Outcome: Ongoing  1/9/2020 0354 by Sal Lomax RN  Outcome: Ongoing  Goal: Demonstrates appropriate breast feeding techniques  1/9/2020 0745 by Natalee Segovia RN  Outcome: Ongoing  1/9/2020 0354 by Sal Lomax RN  Outcome: Ongoing  Goal: Appropriate behavior observed  1/9/2020 0745 by Natalee Segovia RN  Outcome: Ongoing  1/9/2020 0354 by Sal Lomax RN  Outcome: Ongoing  Goal: Positive Mother-Baby interactions are observed  1/9/2020 0745 by Natalee Segovia RN  Outcome: Ongoing  1/9/2020 0354 by Sal Lomax RN  Outcome: Ongoing  Goal: Perineum intact without discharge or hematoma  1/9/2020 0745 by Natalee Bottcher, RN  Outcome: Ongoing  1/9/2020 0354 by Sal Lomax RN  Outcome:

## 2020-01-09 NOTE — PROGRESS NOTES
Department of Obstetrics and Gynecology  Labor and Delivery  Attending Post Partum Progress Note      SUBJECTIVE:  Pt ambulating, but unable to void. Bleeding WNL after straight cath for 1200ml. Pt reports \"feeling sore\" at tailbone. Breastfeeding infant well w/LC support. OBJECTIVE:      Vitals:  /64   Pulse 71   Temp 98 °F (36.7 °C) (Axillary)   Resp 16   Ht 5' 5\" (1.651 m)   Wt 200 lb (90.7 kg)   Breastfeeding?  Unknown   BMI 33.28 kg/m²         DATA:    CBC:    Lab Results   Component Value Date    WBC 16.6 01/08/2020    RBC 4.90 01/08/2020    HGB 13.5 01/08/2020    HCT 41.1 01/08/2020    MCV 83.8 01/08/2020    RDW 14.0 01/08/2020     01/08/2020       ASSESSMENT & PLAN:      Normal PP Day 1  Lactating  Stable infant male    Comfort and teaching  Continue PP care  Probable D/C tomorrow

## 2020-01-09 NOTE — LACTATION NOTE
This note was copied from a baby's chart. Lactation Progress Note      Data:     Multip breast feeder requests f/u to assess latch. C/o sore nipples with initial latch on, then comfortable after a few sucks. Pt states baby is sleepy and is time to offer the breast, but struggling to get baby interested. Infant delivered today at 0330. Infant dressed, asleep without feeding cues. Action: Reviewed tips to wake sleepy baby, and infant undressed. Gentle stimulation provided, while explaining to patient that sleepy behavior is common on first DOL as baby recovers from delivery. Infant beginning to wake and rooting. Baby given to mom for breast feeding. Good positioning observed and breast support in cross cradle position, reviewed tips for HENOK. Infant rooting with wide open mouth HENOK achieved with suck bursts, then asleep at the breast. Encouraged stimulation to baby to keep infant awake and interested but again reassured baby will likely be sleepy until > 24 hours old, then cluster feed. Explained what to expect with cluster feeding behavior and importance to hand express colostrum to support glucose if infant is disinterested at the breast with attempts to offer. Infant with suck bursts for few minutes, then detached and asleep without cues. Encouraged hand expression of colostrum for sleepy . 10-15 large drops of colostrum expressed and fed to . Infant rooting. HENOK achieved with SRS and AS for a few minutes, then asleep at the breast and without cues. Breast feeding education reviewed including breast care, expected  feeding behaviors in first 24-48 hours of life, signs of hunger/satiety, and how to know baby is getting enough. Reviewed importance of deep latch, and explained how a good latch should look and feel. Instructed how to break latch if shallow, pinching, or painful and encouraged to break latch as needed.  Reviewed that nipple should be rounded when baby releases from the breast without pinching, blanching, or redness. Encouraged to offer the breast often and on demand when baby is just beginning to wake and show signs of hunger. Encouraged to wake baby as needed to offer the breast q3h if baby is sleepy and without cues. Discouraged use of pacifiers, formula supplements, or artificial nipples unless medically indicated, explaining risks to breast feeding relationship and benefits of exclusive breast feeding. Name and number on whiteboard. Encouraged to call for f/u support and assistance as needed. Response: Verbalized understanding of teaching provided. Will call for f/u prn.

## 2020-01-09 NOTE — PROGRESS NOTES
Up to bathroom. Unable to void. Marissa care done with new pad and ice pack on. Fundus firm at U. Pt c/o perineal pressure. Straight cath 1050mls clear urine. Educated on emptying bladder and vaginal bleeding. Encouraged to try every hour or so. Will observe for urination on own.

## 2020-01-09 NOTE — ANESTHESIA PROCEDURE NOTES
Epidural Block    Patient location during procedure: OB  Reason for block: labor epidural  Staffing  Resident/CRNA: Ida Friend APRN - CRNA  Preanesthetic Checklist  Completed: patient identified, pre-op evaluation, timeout performed, IV checked, risks and benefits discussed, monitors and equipment checked, anesthesia consent given, oxygen available and patient being monitored  Epidural  Patient position: sitting  Prep: ChloraPrep  Patient monitoring: continuous pulse ox  Approach: midline  Location: lumbar (1-5)  Injection technique: ROMAN saline  Provider prep: mask and sterile gloves  Needle  Needle type: Tuohy   Needle gauge: 17 G  Needle length: 3.5 in  Needle insertion depth: 5.5 cm  Catheter type: side hole  Catheter size: 19 G  Catheter at skin depth: 11 cm  Test dose: negative  Assessment  Hemodynamics: stable  Attempts: 1  Additional Notes  Pt prepped and draped in sterile fashion. Skin wheal with 1% lidocaine. ROMAN with 3 cc NS, 25g spinal needle inserted per rose mary. Clear CSF visualized in hub. Needle withdrawn and catheter threaded. Negative test dose 3cc 1.5% Lidocaine with Epinephrine. Sterile dressing applied.

## 2020-01-09 NOTE — PROGRESS NOTES
VIOLETA Parks CNM and Justina Montielenix at bedside for AROM and SVE.  AROM for clear fluid at 0034. 7/80/0

## 2020-01-09 NOTE — PROGRESS NOTES
Patient actively pushing. RN and CNM remain in continuous attendance at the bedside. Assessment & evaluation of fetal heart rate ongoing via continuous EFM.

## 2020-01-09 NOTE — PROGRESS NOTES
S: Pt resting comfortably with epidural.  O:   Vitals:    20 2213 20 2216 20 2219 20 2305   BP: 113/70 110/70 109/70 (!) 101/55   Pulse: 77 75 71 72   Resp: 18 16 16 16   Temp:       TempSrc:       Weight:       Height:         , moderate variability, +accels, no decels  TOCO: q2-3min  VE: 7/80/0  AROM for moderate amt of clear fluid  A/P: Active labor, progressing toward second stage  Anticipate   Cat 1 FHR tracing    Ishmael Decamp, SNM STEPHANIE Annmarie Heimlich, CNM

## 2020-01-09 NOTE — LACTATION NOTE
This note was copied from a baby's chart. Lactation Progress Note      Data:   RN requesting LC assistance with BF. States that LGA baby just had blood sugar of 37. Mom currently attempting to feed sleepy baby that is bundled. Action: Explained that skin to skin is more effecting for feeding a sleepy baby. Assisted with good position skin to skin at breast. Mom expressed much colostrum to encourage feed. Baby began rooting and a good latch was achieved with SRS and AS. BF education provided. Encouraged to allow baby to go to breast ad artie and stressed importance of a good deep latch. Offered LC assistance prn. Discouraged paci and bottles for the first few weeks. Encouraged good hydration and nutrition. [facility] number on board for f/u. Response: Pleased with feed. Verbalized and demonstrated understanding.

## 2020-01-09 NOTE — ANESTHESIA PRE PROCEDURE
Department of Anesthesiology  Preprocedure Note       Name:  Yesenia Bell   Age:  26 y.o.  :  1990                                          MRN:  [id number]         Date:  2020      Surgeon: * No surgeons listed *    Procedure: Labor Analgesia    Medications prior to admission:   Prior to Admission medications    Medication Sig Start Date End Date Taking? Authorizing Provider   ibuprofen (ADVIL;MOTRIN) 800 MG tablet Take 1 tablet by mouth every 6 hours as needed for Pain 18   TWAN Thomas - LISA   docusate sodium (COLACE, DULCOLAX) 100 MG CAPS Take 100 mg by mouth 2 times daily 18   TWAN Thomas - LISA   Prenatal Vit-Fe Fumarate-FA (PRENATAL 1 PLUS 1 PO) Take 1 tablet by mouth daily    Historical Provider, MD   acetaminophen (TYLENOL) 325 MG tablet Take 650 mg by mouth every 6 hours as needed for Pain    Historical Provider, MD   Fluticasone Propionate (FLONASE NA) by Nasal route.     Historical Provider, MD       Current medications:    Current Facility-Administered Medications   Medication Dose Route Frequency Provider Last Rate Last Dose    lactated ringers infusion             lactated ringers infusion   Intravenous Continuous Adela Blocker, APRN - CNM        sodium chloride flush 0.9 % injection 10 mL  10 mL Intravenous 2 times per day Adela Blocker, APRN - CNM        sodium chloride flush 0.9 % injection 10 mL  10 mL Intravenous PRN Adela Blocker, APRN - CNM        acetaminophen (TYLENOL) tablet 650 mg  650 mg Oral Q4H PRN Adela Blocker, APRN - CNM        docusate sodium (COLACE) capsule 100 mg  100 mg Oral BID Adela Blocker, APRN - CNM        ondansetron [facility]) injection 4 mg  4 mg Intravenous Q6H PRN Adela Blocker, APRN - CNM        benzocaine-menthol (DERMOPLAST) 20-0.5 % spray   Topical PRN Adela Blocker, APRN - CNM           Allergies:  No Known Allergies    Problem List:    Patient Active Problem List   Diagnosis Code    Spontaneous vaginal delivery O80    Second degree perineal laceration during delivery O70.1    Normal labor O80, Z37.9       Past Medical History:  History reviewed. No pertinent past medical history. Past Surgical History:        Procedure Laterality Date    SINUS SURGERY      TONSILLECTOMY         Social History:    Social History     Tobacco Use    Smoking status: Never Smoker    Smokeless tobacco: Never Used   Substance Use Topics    Alcohol use: No                                Counseling given: Not Answered      Vital Signs (Current):   Vitals:    01/08/20 1936 01/08/20 2012   BP: 130/76 120/72   Pulse: 77 81   Resp:  16   Temp:  36.9 °C (98.4 °F)   TempSrc:  Oral   Weight:  200 lb (90.7 kg)   Height:  5' 5\" (1.651 m)                                              BP Readings from Last 3 Encounters:   01/08/20 120/72   12/22/18 105/70   12/10/14 115/79       NPO Status: Time of last liquid consumption: 1800                        Time of last solid consumption: 1100                        Date of last liquid consumption: 01/08/20                        Date of last solid food consumption: 01/08/20    BMI:   Wt Readings from Last 3 Encounters:   01/08/20 200 lb (90.7 kg)   12/20/18 191 lb (86.6 kg)   12/10/14 150 lb (68 kg)     Body mass index is 33.28 kg/m². CBC:   Lab Results   Component Value Date    WBC 16.6 01/08/2020    RBC 4.90 01/08/2020    HGB 13.5 01/08/2020    HCT 41.1 01/08/2020    MCV 83.8 01/08/2020    RDW 14.0 01/08/2020     01/08/2020       CMP: No results found for: NA, K, CL, CO2, BUN, CREATININE, GFRAA, AGRATIO, LABGLOM, GLUCOSE, PROT, CALCIUM, BILITOT, ALKPHOS, AST, ALT    POC Tests: No results for input(s): POCGLU, POCNA, POCK, POCCL, POCBUN, POCHEMO, POCHCT in the last 72 hours.     Coags: No results found for: PROTIME, INR, APTT    HCG (If Applicable): No results found for: PREGTESTUR, PREGSERUM, HCG, HCGQUANT     ABGs: No results 1/8/2020

## 2020-01-09 NOTE — H&P
Department of Obstetrics and Gynecology   Obstetrics History and Physical        CHIEF COMPLAINT:  contractions    HISTORY OF PRESENT ILLNESS:      The patient is a 26 y.o. female at Unknown. OB History        2    Para   1    Term   1            AB        Living   1       SAB        TAB        Ectopic        Molar        Multiple   0    Live Births   1            Patient presents with a chief complaint as above and is being admitted for active phase labor    Estimated Due Date: Estimated Date of Delivery: None noted. PRENATAL CARE:    Complicated by: none    PAST OB HISTORY:  OB History        2    Para   1    Term   1            AB        Living   1       SAB        TAB        Ectopic        Molar        Multiple   0    Live Births   1                Past Medical History:    No past medical history on file. Past Surgical History:        Procedure Laterality Date    SINUS SURGERY      TONSILLECTOMY       Allergies:  Patient has no known allergies.     Social History:    Social History     Socioeconomic History    Marital status:      Spouse name: Not on file    Number of children: Not on file    Years of education: Not on file    Highest education level: Not on file   Occupational History    Not on file   Social Needs    Financial resource strain: Not on file    Food insecurity:     Worry: Not on file     Inability: Not on file    Transportation needs:     Medical: Not on file     Non-medical: Not on file   Tobacco Use    Smoking status: Never Smoker    Smokeless tobacco: Never Used   Substance and Sexual Activity    Alcohol use: No    Drug use: No    Sexual activity: Not on file   Lifestyle    Physical activity:     Days per week: Not on file     Minutes per session: Not on file    Stress: Not on file   Relationships    Social connections:     Talks on phone: Not on file     Gets together: Not on file     Attends Islam service: Not on file     Active

## 2020-01-10 VITALS
TEMPERATURE: 98 F | DIASTOLIC BLOOD PRESSURE: 58 MMHG | RESPIRATION RATE: 16 BRPM | SYSTOLIC BLOOD PRESSURE: 101 MMHG | HEART RATE: 60 BPM | HEIGHT: 65 IN | BODY MASS INDEX: 33.32 KG/M2 | WEIGHT: 200 LBS

## 2020-01-10 LAB
HCT VFR BLD CALC: 33.8 % (ref 36–48)
HEMOGLOBIN: 11.1 G/DL (ref 12–16)
MCH RBC QN AUTO: 27.5 PG (ref 26–34)
MCHC RBC AUTO-ENTMCNC: 32.9 G/DL (ref 31–36)
MCV RBC AUTO: 83.6 FL (ref 80–100)
PDW BLD-RTO: 14.5 % (ref 12.4–15.4)
PLATELET # BLD: 249 K/UL (ref 135–450)
PMV BLD AUTO: 8.1 FL (ref 5–10.5)
RBC # BLD: 4.04 M/UL (ref 4–5.2)
WBC # BLD: 14 K/UL (ref 4–11)

## 2020-01-10 PROCEDURE — 6370000000 HC RX 637 (ALT 250 FOR IP): Performed by: ADVANCED PRACTICE MIDWIFE

## 2020-01-10 PROCEDURE — 36415 COLL VENOUS BLD VENIPUNCTURE: CPT

## 2020-01-10 PROCEDURE — 85027 COMPLETE CBC AUTOMATED: CPT

## 2020-01-10 PROCEDURE — 6370000000 HC RX 637 (ALT 250 FOR IP)

## 2020-01-10 RX ORDER — HYDROCODONE BITARTRATE AND ACETAMINOPHEN 5; 325 MG/1; MG/1
1 TABLET ORAL EVERY 6 HOURS PRN
Qty: 10 TABLET | Refills: 0 | Status: SHIPPED | OUTPATIENT
Start: 2020-01-10 | End: 2020-01-13

## 2020-01-10 RX ORDER — IBUPROFEN 800 MG/1
800 TABLET ORAL EVERY 8 HOURS
Qty: 60 TABLET | Refills: 0 | Status: SHIPPED | OUTPATIENT
Start: 2020-01-10

## 2020-01-10 RX ORDER — PSEUDOEPHEDRINE HCL 30 MG
100 TABLET ORAL 2 TIMES DAILY
COMMUNITY
Start: 2020-01-10

## 2020-01-10 RX ADMIN — HYDROCODONE BITARTRATE AND ACETAMINOPHEN 2 TABLET: 5; 325 TABLET ORAL at 09:05

## 2020-01-10 RX ADMIN — IBUPROFEN 800 MG: 800 TABLET, FILM COATED ORAL at 02:52

## 2020-01-10 RX ADMIN — DOCUSATE SODIUM 100 MG: 100 CAPSULE, LIQUID FILLED ORAL at 12:07

## 2020-01-10 RX ADMIN — HYDROCODONE BITARTRATE AND ACETAMINOPHEN 2 TABLET: 5; 325 TABLET ORAL at 16:12

## 2020-01-10 RX ADMIN — HYDROCODONE BITARTRATE AND ACETAMINOPHEN 2 TABLET: 5; 325 TABLET ORAL at 02:52

## 2020-01-10 ASSESSMENT — PAIN SCALES - GENERAL
PAINLEVEL_OUTOF10: 7
PAINLEVEL_OUTOF10: 7
PAINLEVEL_OUTOF10: 6

## 2020-01-10 NOTE — LACTATION NOTE
This note was copied from a baby's chart. Lactation Progress Note      Data:   F/U for multip breast feeder who pumped for 8 weeks with first baby. Mom states that baby has been feeding well but just now was refusing the left breast. Output and wt loss are WNL. Action: Assisted with good position at left breast. Mom expressed colostrum to encourage latch. Baby rooting and a good latch achieved with SRS and AS. Discharge teaching done; what to expect in the first few days of life, to feed baby at first sign of hunger cue for total of 8-12 times per day after the first DOL, how to properly position and latch baby, how to know baby is getting enough, engorgement prevention and treatment, avoiding bottles and pacifiers and community resources. Encouraged to call [facility] for f/u prn. Response: Pleased with feed. Verbalized and demonstrated understanding and comfortable with breast feeding for d/c.

## 2020-01-10 NOTE — LACTATION NOTE
This note was copied from a baby's chart. Lactation Progress Note      Data:   RN requests f/u on multip breast feeder to assess latch. Infant in football positioning, pt confirms latch is comfortable and infant feeding well. Action: Reassurance given, and reviewed signs of good latch. Reviewed how a good latch should look and feel, signs of shallow latch, and how to break latch if shallow, pinching or painful. Reviewed that nipple should be rounded when baby releases from the breast and without creasing, blanching, pinching or redness. Encouraged to call for f/u support prn. Response: Verbalized understanding of teaching provided. Pleased with feed and comfortable latch. Feeling more comfortable in football position. Will call for f/u prn.

## 2020-01-10 NOTE — ANESTHESIA POSTPROCEDURE EVALUATION
Department of Anesthesiology  Postprocedure Note    Patient: Yesenia Bell  MRN: [id number]  YOB: 1990  Date of evaluation: 1/10/2020  Time:  3:44 PM     Procedure Summary     Date:  01/08/20 Room / Location:      Anesthesia Start:  2153 Anesthesia Stop:  01/09/20 0330    Procedure:  Labor Analgesia Diagnosis:      Scheduled Providers:   Responsible Provider:  Makayla Kam MD    Anesthesia Type:  epidural ASA Status:  2 - Emergent          Anesthesia Type: epidural    Acacia Phase I: Acacia Score: 9    Acacia Phase II: Acacia Score: 10    Last vitals: Reviewed and per EMR flowsheets. Anesthesia Post Evaluation    Patient location during evaluation: bedside  Patient participation: complete - patient participated  Level of consciousness: awake and alert  Pain score: 1  Airway patency: patent  Nausea & Vomiting: no vomiting and no nausea  Complications: no  Cardiovascular status: hemodynamically stable  Respiratory status: acceptable and room air  Hydration status: stable  Comments: S/P vaginal delivery with epidural analgesia 1-9-2020. No reported or apparent anesthesia complications. VSS.

## 2022-08-04 ENCOUNTER — HOSPITAL ENCOUNTER (EMERGENCY)
Age: 32
Discharge: HOME | End: 2022-08-05
Payer: COMMERCIAL

## 2022-08-04 ENCOUNTER — APPOINTMENT (OUTPATIENT)
Dept: GENERAL RADIOLOGY | Age: 32
End: 2022-08-04
Payer: COMMERCIAL

## 2022-08-04 DIAGNOSIS — Z20.822 COVID-19 VIRUS NOT DETECTED: ICD-10-CM

## 2022-08-04 DIAGNOSIS — J06.9 UPPER RESPIRATORY TRACT INFECTION, UNSPECIFIED TYPE: Primary | ICD-10-CM

## 2022-08-04 LAB — SARS-COV-2, NAAT: NOT DETECTED

## 2022-08-04 PROCEDURE — 87635 SARS-COV-2 COVID-19 AMP PRB: CPT

## 2022-08-04 PROCEDURE — 71046 X-RAY EXAM CHEST 2 VIEWS: CPT

## 2022-08-04 PROCEDURE — 99284 EMERGENCY DEPT VISIT MOD MDM: CPT

## 2022-08-04 PROCEDURE — 93005 ELECTROCARDIOGRAM TRACING: CPT

## 2022-08-04 ASSESSMENT — PAIN SCALES - GENERAL: PAINLEVEL_OUTOF10: 8

## 2022-08-04 ASSESSMENT — PAIN - FUNCTIONAL ASSESSMENT: PAIN_FUNCTIONAL_ASSESSMENT: 0-10

## 2022-08-04 ASSESSMENT — PAIN DESCRIPTION - FREQUENCY: FREQUENCY: INTERMITTENT

## 2022-08-04 ASSESSMENT — PAIN DESCRIPTION - DESCRIPTORS: DESCRIPTORS: ACHING

## 2022-08-04 ASSESSMENT — PAIN DESCRIPTION - PAIN TYPE: TYPE: ACUTE PAIN

## 2022-08-04 ASSESSMENT — PAIN DESCRIPTION - LOCATION: LOCATION: CHEST

## 2022-08-05 VITALS
SYSTOLIC BLOOD PRESSURE: 132 MMHG | OXYGEN SATURATION: 98 % | RESPIRATION RATE: 18 BRPM | HEART RATE: 87 BPM | TEMPERATURE: 98.2 F | DIASTOLIC BLOOD PRESSURE: 65 MMHG

## 2022-08-05 ASSESSMENT — ENCOUNTER SYMPTOMS
ABDOMINAL PAIN: 0
BACK PAIN: 0
EYE PAIN: 0
NAUSEA: 0
VOMITING: 0
SORE THROAT: 0
SHORTNESS OF BREATH: 1
COUGH: 1

## 2022-08-05 NOTE — ED PROVIDER NOTES
**ADVANCED PRACTICE PROVIDER, I HAVE EVALUATED THIS PATIENT**        [facility] ENCOUNTER      Pt Name: Devyn STEVENSONY:[id number]  [place] 1990  Date of evaluation: 8/4/2022  Provider: Anna Garcia PA-C      Chief Complaint:    Chief Complaint   Patient presents with    Concern For COVID-19     Body aches, headache, SOB         Nursing Notes, Past Medical Hx, Past Surgical Hx, Social Hx, Allergies, and Family Hx were all reviewed and agreed with or any disagreements were addressed in the HPI.    HPI: (Location, Duration, Timing, Severity, Quality, Assoc Sx, Context, Modifying factors)    Chief Complaint of body aches, cough, says she has had some chills. She is concerned for possible COVID. She has been vaccinated but no booster. Says when she coughs hard she gets some chest pain. History of asthmatic bronchitis. Denies lightheaded or dizziness. No extremity pain or weakness. No abdominal pain. No other complaints. This is a  34 y.o. female who presents to the emergency room with the above complaint. PastMedical/Surgical History:  History reviewed. No pertinent past medical history. Procedure Laterality Date    SINUS SURGERY      TONSILLECTOMY         Medications:  Previous Medications    ACETAMINOPHEN (TYLENOL) 325 MG TABLET    Take 650 mg by mouth every 6 hours as needed for Pain    DOCUSATE SODIUM (COLACE, DULCOLAX) 100 MG CAPS    Take 100 mg by mouth 2 times daily    FLUTICASONE PROPIONATE (FLONASE NA)    by Nasal route. IBUPROFEN (ADVIL;MOTRIN) 800 MG TABLET    Take 1 tablet by mouth every 8 hours    PRENATAL VIT-FE FUMARATE-FA (PRENATAL 1 PLUS 1 PO)    Take 1 tablet by mouth daily         Review of Systems:  (2-9 systems needed)  Review of Systems   Constitutional:  Negative for chills and fever. HENT:  Negative for congestion and sore throat. Eyes:  Negative for pain and visual disturbance.    Respiratory: Positive for cough and shortness of breath. Cardiovascular:  Negative for chest pain and leg swelling. Gastrointestinal:  Negative for abdominal pain, nausea and vomiting. Genitourinary:  Negative for dysuria and frequency. Musculoskeletal:  Positive for myalgias. Negative for back pain and neck pain. Skin:  Negative for rash and wound. Neurological:  Positive for headaches. Negative for dizziness and light-headedness. \"Positives and Pertinent negatives as per HPI\"    Physical Exam:  Physical Exam  Vitals and nursing note reviewed. Constitutional:       Appearance: She is well-developed. She is not diaphoretic. HENT:      Head: Normocephalic and atraumatic. Nose: Nose normal.      Mouth/Throat:      Mouth: Mucous membranes are moist.      Pharynx: Oropharynx is clear. No oropharyngeal exudate or posterior oropharyngeal erythema. Eyes:      General:         Right eye: No discharge. Left eye: No discharge. Cardiovascular:      Rate and Rhythm: Normal rate and regular rhythm. Heart sounds: Normal heart sounds. No murmur heard. No friction rub. No gallop. Pulmonary:      Effort: Pulmonary effort is normal. No respiratory distress. Breath sounds: Normal breath sounds. No wheezing or rales. Abdominal:      General: Bowel sounds are normal. There is no distension. Palpations: Abdomen is soft. There is no mass. Tenderness: There is no abdominal tenderness. There is no guarding or rebound. Musculoskeletal:         General: Normal range of motion. Cervical back: Normal range of motion and neck supple. Skin:     General: Skin is warm and dry. Neurological:      General: No focal deficit present. Mental Status: She is alert and oriented to person, place, and time.    Psychiatric:         Behavior: Behavior normal.       MEDICAL DECISION MAKING    Vitals:    Vitals:    08/04/22 2256   BP: (!) 135/92   Pulse: 86   Resp: 18   Temp: 99 °F (37.2 °C) TempSrc: Oral   SpO2: 97%       LABS:  Labs Reviewed   COVID-19, RAPID        Remainder of labs reviewed and were negative at this time or not returned at the time of this note. RADIOLOGY:   Non-plain film images such as CT, Ultrasound and MRI are read by the radiologist. Deysi Esquivel PA-C have directly visualized the radiologic plain film image(s) with the below findings:      Interpretation per the Radiologist below, if available at the time of this note:    XR CHEST (2 VW)   Final Result   No acute process. XR CHEST (2 VW)    Result Date: 8/4/2022  EXAMINATION: TWO XRAY VIEWS OF THE CHEST 8/4/2022 11:26 pm COMPARISON: None. HISTORY: ORDERING SYSTEM PROVIDED HISTORY: Chest Discomfort TECHNOLOGIST PROVIDED HISTORY: Reason for exam:->Chest Discomfort Reason for Exam: chest pain; cough; concern for covid FINDINGS: Surgical clips left upper lung field. The lungs are without acute focal process. There is no effusion or pneumothorax. The cardiomediastinal silhouette is without acute process. The osseous structures are without acute process. No acute process. MEDICAL DECISION MAKING / ED COURSE:      PROCEDURES:   Procedures    None    Patient was given:  Medications - No data to display    Emergency room course: Patient on exam throat is clear. Nonerythematous no exudate. Cardiovascular regular rhythm, lungs are clear. No wheeze rales or rhonchi noted. Abdomen is soft nontender. Normal bowel sounds all 4 quadrant. Patient has full range of motion of extremity alert oriented x4. Does not appear to be in acute distress. Rapid COVID-19 is not detected. Rapid strep is negative for group A strep. At this point I did discuss with patient her test results from today. Discussed that this is most likely upper respiratory infection. She tells me she has prescribed cough suppressant and inhaler at home.   Inform her to use that as needed as well take OTC Motrin or Tylenol as needed for any body aches and or fever. Return emergency room for any worsening. She was okay with this plan she will be discharged stable condition. The patient tolerated their visit well. I evaluated the patient. The physician was available for consultation as needed. The patient and / or the family were informed of the results of any tests, a time was given to answer questions, a plan was proposed and they agreed with plan. CLINICAL IMPRESSION:  1. Upper respiratory tract infection, unspecified type    2.  COVID-19 virus not detected        DISPOSITION Decision To Discharge 08/05/2022 01:34:08 AM      PATIENT REFERRED TO:  TWAN Cullen - CNP  [address] [place] [address] Drive [address] [place] [address]  [phone]    Call   As needed    [facility] Emergency Department  [address] [place] [address]  [phone]  Call   If symptoms worsen    DISCHARGE MEDICATIONS:  New Prescriptions    No medications on file       DISCONTINUED MEDICATIONS:  Discontinued Medications    No medications on file              (Please note the MDM and HPI sections of this note were completed with a voice recognition program.  Efforts were made to edit the dictations but occasionally words are mis-transcribed.)    Electronically signed, Yahaira Flowers PA-C,           Yahaira Flowers PA-C  08/05/22 7016

## 2022-08-05 NOTE — DISCHARGE INSTRUCTIONS
Take OTC Motrin Tylenol as needed for any body aches and or fever  Follow-up your primary care physician  Return emergency room for any worsening

## 2022-08-08 LAB
EKG ATRIAL RATE: 77 BPM
EKG DIAGNOSIS: NORMAL
EKG P AXIS: 48 DEGREES
EKG P-R INTERVAL: 190 MS
EKG Q-T INTERVAL: 408 MS
EKG QRS DURATION: 96 MS
EKG QTC CALCULATION (BAZETT): 461 MS
EKG R AXIS: 13 DEGREES
EKG T AXIS: -18 DEGREES
EKG VENTRICULAR RATE: 77 BPM

## 2025-06-23 ENCOUNTER — OFFICE VISIT (OUTPATIENT)
Dept: FAMILY MEDICINE CLINIC | Age: 35
End: 2025-06-23
Payer: COMMERCIAL

## 2025-06-23 VITALS
WEIGHT: 177.6 LBS | OXYGEN SATURATION: 97 % | RESPIRATION RATE: 16 BRPM | TEMPERATURE: 98 F | DIASTOLIC BLOOD PRESSURE: 70 MMHG | HEART RATE: 71 BPM | SYSTOLIC BLOOD PRESSURE: 104 MMHG | BODY MASS INDEX: 29.59 KG/M2 | HEIGHT: 65 IN

## 2025-06-23 DIAGNOSIS — R19.7 BLOODY DIARRHEA: ICD-10-CM

## 2025-06-23 DIAGNOSIS — F42.2 MIXED OBSESSIONAL THOUGHTS AND ACTS: ICD-10-CM

## 2025-06-23 DIAGNOSIS — J30.2 SEASONAL ALLERGIES: ICD-10-CM

## 2025-06-23 DIAGNOSIS — Z76.89 ENCOUNTER TO ESTABLISH CARE: Primary | ICD-10-CM

## 2025-06-23 DIAGNOSIS — E03.9 HYPOTHYROIDISM, UNSPECIFIED TYPE: ICD-10-CM

## 2025-06-23 DIAGNOSIS — F41.9 ANXIETY: ICD-10-CM

## 2025-06-23 DIAGNOSIS — J45.990 ASTHMA, EXERCISE INDUCED: ICD-10-CM

## 2025-06-23 PROCEDURE — 99204 OFFICE O/P NEW MOD 45 MIN: CPT | Performed by: PHYSICIAN ASSISTANT

## 2025-06-23 RX ORDER — FLUOXETINE HYDROCHLORIDE 40 MG/1
80 CAPSULE ORAL DAILY
COMMUNITY

## 2025-06-23 RX ORDER — RISPERIDONE 0.5 MG/1
0.5 TABLET ORAL NIGHTLY
COMMUNITY

## 2025-06-23 RX ORDER — LEVOTHYROXINE SODIUM 25 UG/1
25 TABLET ORAL DAILY
COMMUNITY

## 2025-06-23 RX ORDER — CETIRIZINE HYDROCHLORIDE 10 MG/1
10 TABLET ORAL DAILY
COMMUNITY

## 2025-06-23 RX ORDER — TRAZODONE HYDROCHLORIDE 100 MG/1
150 TABLET ORAL NIGHTLY
COMMUNITY

## 2025-06-23 SDOH — ECONOMIC STABILITY: FOOD INSECURITY: WITHIN THE PAST 12 MONTHS, YOU WORRIED THAT YOUR FOOD WOULD RUN OUT BEFORE YOU GOT MONEY TO BUY MORE.: NEVER TRUE

## 2025-06-23 SDOH — HEALTH STABILITY: PHYSICAL HEALTH: ON AVERAGE, HOW MANY MINUTES DO YOU ENGAGE IN EXERCISE AT THIS LEVEL?: 50 MIN

## 2025-06-23 SDOH — ECONOMIC STABILITY: FOOD INSECURITY: WITHIN THE PAST 12 MONTHS, THE FOOD YOU BOUGHT JUST DIDN'T LAST AND YOU DIDN'T HAVE MONEY TO GET MORE.: NEVER TRUE

## 2025-06-23 SDOH — HEALTH STABILITY: PHYSICAL HEALTH: ON AVERAGE, HOW MANY DAYS PER WEEK DO YOU ENGAGE IN MODERATE TO STRENUOUS EXERCISE (LIKE A BRISK WALK)?: 3 DAYS

## 2025-06-23 ASSESSMENT — PATIENT HEALTH QUESTIONNAIRE - PHQ9
SUM OF ALL RESPONSES TO PHQ QUESTIONS 1-9: 3
4. FEELING TIRED OR HAVING LITTLE ENERGY: SEVERAL DAYS
2. FEELING DOWN, DEPRESSED OR HOPELESS: NOT AT ALL
8. MOVING OR SPEAKING SO SLOWLY THAT OTHER PEOPLE COULD HAVE NOTICED. OR THE OPPOSITE, BEING SO FIGETY OR RESTLESS THAT YOU HAVE BEEN MOVING AROUND A LOT MORE THAN USUAL: NOT AT ALL
10. IF YOU CHECKED OFF ANY PROBLEMS, HOW DIFFICULT HAVE THESE PROBLEMS MADE IT FOR YOU TO DO YOUR WORK, TAKE CARE OF THINGS AT HOME, OR GET ALONG WITH OTHER PEOPLE: NOT DIFFICULT AT ALL
5. POOR APPETITE OR OVEREATING: SEVERAL DAYS
1. LITTLE INTEREST OR PLEASURE IN DOING THINGS: NOT AT ALL
9. THOUGHTS THAT YOU WOULD BE BETTER OFF DEAD, OR OF HURTING YOURSELF: NOT AT ALL
6. FEELING BAD ABOUT YOURSELF - OR THAT YOU ARE A FAILURE OR HAVE LET YOURSELF OR YOUR FAMILY DOWN: NOT AT ALL
SUM OF ALL RESPONSES TO PHQ QUESTIONS 1-9: 3
3. TROUBLE FALLING OR STAYING ASLEEP: SEVERAL DAYS
7. TROUBLE CONCENTRATING ON THINGS, SUCH AS READING THE NEWSPAPER OR WATCHING TELEVISION: NOT AT ALL
SUM OF ALL RESPONSES TO PHQ QUESTIONS 1-9: 3
SUM OF ALL RESPONSES TO PHQ QUESTIONS 1-9: 3

## 2025-06-23 NOTE — PROGRESS NOTES
[facility] MEDICINE  06/23/25       IMPRESSION/ PLAN    Encounter to establish care  - Medical records updated.   - followup next week for annual PE w fasting labs    Bloody diarrhea  Will see GI tomorrow.     Hypothyroidism   - sees gyn/endocrine who addresses this along with hormones.    Mixed obsessional thoughts and acts  Anxiety  - followed by Psychiatrist and stable on Trazodone, Respirdal and Prozac    Asthma, exercise induced  Seasonal allergies  Uses Zyrtec and nasal steroid. Inhaler prrn             CHIEF COMPLAINT  Chief Complaint   Patient presents with    New Patient     Pt is here to establish care with Marcia Geovanna as a new patient     HISTORY OF PRESENT  ILLNESS  Vikki Gonzalez is a 35 y.o.  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    Diarrhea with blood x 2 months. Feels bloated and right sided abdominal cramping. No constipation.  Up to 15x per day.  No dizziness or lightheadedness. Fhx Crohns. Will see GI tomorrow  Hypothyrroid- on med, thru gyn and hormone specialist. Does bloodwork every 6-8 weeks. She prescribes levothyroxine.   Anx/OCD- on meds for past 2 years. None prior. Now on  trazodone, Respirdal, and Prozac. [facility] , [facility].   Allergies- on zyrtec and flonase.   ROS:  Remaining reviewed and are unremarkable for other constitutional, EENT, cardiac, pulmonary, GI, , neurologic, musculoskeletal, or integumentary complaints.      PAST MEDICAL/SURGICAL, SOCIAL, &  FAMILY HISTORY:  Reviewed and updated accordingly.     ALLERGIES : Patient has no known allergies.      MEDICATIONS:  Current Outpatient Medications on File Prior to Visit   Medication Sig Dispense Refill    traZODone (DESYREL) 100 MG tablet Take 1.5 tablets by mouth nightly      risperiDONE (RISPERDAL) 0.5 MG tablet Take 1 tablet by mouth nightly at bedtime.      levothyroxine (SYNTHROID) 25 MCG tablet Take 1 tablet by

## 2025-06-24 LAB
BASOPHILS ABSOLUTE: 0.1 /ΜL
BASOPHILS RELATIVE PERCENT: 1 %
BUN BLDV-MCNC: 13 MG/DL
C-REACTIVE PROTEIN: 4
CALCIUM SERPL-MCNC: NORMAL MG/DL
CHLORIDE BLD-SCNC: 102 MMOL/L
CO2: 19 MMOL/L
CREAT SERPL-MCNC: 0.9 MG/DL
EGFR: 85
EOSINOPHILS ABSOLUTE: 0.3 /ΜL
EOSINOPHILS RELATIVE PERCENT: 3 %
FERRITIN: 141 NG/ML (ref 9–150)
GLUCOSE BLD-MCNC: 84 MG/DL
HCT VFR BLD CALC: 41 % (ref 36–46)
HEMOGLOBIN: 12.8 G/DL (ref 12–16)
IRON: 52
LYMPHOCYTES ABSOLUTE: 1.6 /ΜL
LYMPHOCYTES RELATIVE PERCENT: 17 %
MCH RBC QN AUTO: 29.3 PG
MCHC RBC AUTO-ENTMCNC: 31.2 G/DL
MCV RBC AUTO: 94 FL
MONOCYTES ABSOLUTE: 0.6 /ΜL
MONOCYTES RELATIVE PERCENT: 7 %
NEUTROPHILS ABSOLUTE: 6.4 /ΜL
NEUTROPHILS RELATIVE PERCENT: 71 %
PLATELET # BLD: 268 K/ΜL
PMV BLD AUTO: NORMAL FL
POTASSIUM SERPL-SCNC: 4.6 MMOL/L
RBC # BLD: 4.37 10^6/ΜL
SODIUM BLD-SCNC: 137 MMOL/L
TOTAL IRON BINDING CAPACITY: 300
WBC # BLD: 9 10^3/ML

## 2025-07-02 ENCOUNTER — OFFICE VISIT (OUTPATIENT)
Dept: FAMILY MEDICINE CLINIC | Age: 35
End: 2025-07-02
Payer: COMMERCIAL

## 2025-07-02 VITALS
HEIGHT: 65 IN | SYSTOLIC BLOOD PRESSURE: 106 MMHG | WEIGHT: 176.4 LBS | TEMPERATURE: 98 F | DIASTOLIC BLOOD PRESSURE: 66 MMHG | RESPIRATION RATE: 16 BRPM | OXYGEN SATURATION: 97 % | HEART RATE: 66 BPM | BODY MASS INDEX: 29.39 KG/M2

## 2025-07-02 DIAGNOSIS — Z79.899 HISTORY OF ONGOING TREATMENT WITH HIGH-RISK MEDICATION: ICD-10-CM

## 2025-07-02 DIAGNOSIS — Z00.00 ANNUAL PHYSICAL EXAM: Primary | ICD-10-CM

## 2025-07-02 PROBLEM — R87.619 ABNORMAL PAP SMEAR OF CERVIX: Status: ACTIVE | Noted: 2025-07-02

## 2025-07-02 LAB
ALBUMIN SERPL-MCNC: 4.3 G/DL (ref 3.4–5)
ALBUMIN/GLOB SERPL: 1.9 {RATIO} (ref 1.1–2.2)
ALP SERPL-CCNC: 79 U/L (ref 40–129)
ALT SERPL-CCNC: 19 U/L (ref 10–40)
ANION GAP SERPL CALCULATED.3IONS-SCNC: 11 MMOL/L (ref 3–16)
AST SERPL-CCNC: 20 U/L (ref 15–37)
BILIRUB SERPL-MCNC: 0.6 MG/DL (ref 0–1)
BUN SERPL-MCNC: 12 MG/DL (ref 7–20)
CALCIUM SERPL-MCNC: 9.2 MG/DL (ref 8.3–10.6)
CHLORIDE SERPL-SCNC: 102 MMOL/L (ref 99–110)
CHOLEST SERPL-MCNC: 188 MG/DL (ref 0–199)
CO2 SERPL-SCNC: 25 MMOL/L (ref 21–32)
CREAT SERPL-MCNC: 1 MG/DL (ref 0.6–1.1)
GFR SERPLBLD CREATININE-BSD FMLA CKD-EPI: 75 ML/MIN/{1.73_M2}
GLUCOSE SERPL-MCNC: 91 MG/DL (ref 70–99)
HDLC SERPL-MCNC: 55 MG/DL (ref 40–60)
LDLC SERPL CALC-MCNC: 106 MG/DL
POTASSIUM SERPL-SCNC: 4.5 MMOL/L (ref 3.5–5.1)
PROT SERPL-MCNC: 6.6 G/DL (ref 6.4–8.2)
SODIUM SERPL-SCNC: 138 MMOL/L (ref 136–145)
TRIGL SERPL-MCNC: 136 MG/DL (ref 0–150)
TSH SERPL DL<=0.005 MIU/L-ACNC: 1.64 UIU/ML (ref 0.27–4.2)
VLDLC SERPL CALC-MCNC: 27 MG/DL

## 2025-07-02 PROCEDURE — 36415 COLL VENOUS BLD VENIPUNCTURE: CPT | Performed by: PHYSICIAN ASSISTANT

## 2025-07-02 PROCEDURE — 99395 PREV VISIT EST AGE 18-39: CPT | Performed by: PHYSICIAN ASSISTANT

## 2025-07-02 NOTE — PATIENT INSTRUCTIONS
Healthy Living Recommendations 2025:  Exercise 150 minutes/ week: Aerobic and Weights Aerobic exercise strengthens muscle while weights and resistance strengthens bone. Body Mass Index (BMI) goal is 25.     Nutrition: Avoid salting foods. Limit caffeine to less than 3 cups caffeine/day. Limit carbohydrates like breads, rice, and pastas. Limit sugar intake. Avoid processed and fried foods. Eat baked or broiled foods. One can never have too many vegetables and fruits which are good fiber source. Fiber lowers glucose levels. Studies show diets high in red meat and processed foods WILL increase the risk heart and liver diseases, cancers, and dementia.   Sugar : Female: Max 6 tsp or 24 grams/ day               Male: Max 9 tsp or 28 grams/ day  Protein:   Protects immune system, regulate hormones,and builds muscle. High protein foods: [place], Greek yogurt, Chicken, Lentils, Eggs  Calcium/ Vit D3  Stimulates bone growth. Promotes digestion, kidneys, and mental health.   High calcium foods:  milk, yogurt, cheese, beans, dark green leafy vegetables. High Vitamin D foods:  salmon, tuna fish, fortified cereals, mushrooms.  Tobacco: Avoid all smoking and vapping.      Eye exam every 2 years after age 40.   Dental; Brush twice a day. Floss daily. Dentist exam every 6 months.  Noise: recurrent LOUD noise WILL result in hearing loss. If wearing Earbuds or headphones, keep volume below 50%. Just 5 minutes at 100% volume will result in permanent hearing loss.   Colonoscopy Begin at age 45 or sooner if family history.    Skin:  Perform monthly skin check for changes.  Breasts/Mammograms: Perform monthly self-breast exam for changes. FM: Yearly mammograms begin age 40. Repeat yearly.  Males   perform monthly self-testicular exam for changes.  Urinary changes can be a sign of prostate issues.         [facility]   [address], [address], [place] [id number]  Office hours: Monday - Friday 7 am- 5 pm.

## 2025-07-02 NOTE — PROGRESS NOTES
[facility] MEDICINE   ANNUAL MEDICAL EXAMINATION       7/2/2025       CC:    Chief Complaint   Patient presents with    Annual Exam     Pt is here for a physical, she is fasting for blood work       HPI: Vikki Gonzalez 1990 is a 35 y.o. NEW to provider as prior provider retired. Here for annual medical examination.     Saw GI , Dr Bashir. Campylobacter was found . On antibiotic and will have colonoscopy soon.      PREVENTIVE HEALTH:   Last eye exam:    2024 glasses  Hearing concerns: No  Last Dental exam:    Every 6 Months  Caffeine use:  1-3/ day  Exercise:  swimming pool, 4d per week. Wts and cardiovascular  Diet: feels needs improvement on  more veggies  Perform routine skin checks? Yes  Perform monthly self breast exams?  No  Last Mammo:   na  Last gyn exam:   < 1 year    Colonoscopy:  na  Advanced directives: no     REVIEW OF SYSTEMS:    Pertinent positive and negatives are in HPI. The remaining reviewed and are unremarkable for other constitutional, EENT, cardiac, pulmonary, GI, , neurologic, musculoskeletal, or integumentary complaints    PAST MEDICAL/SURGICAL/SOCIAL HISTORY:  Reviewed and updated    ALLERGIES:    Patient has no known allergies.    MEDICATIONS:  Current Outpatient Medications on File Prior to Visit   Medication Sig Dispense Refill    traZODone (DESYREL) 100 MG tablet Take 1.5 tablets by mouth nightly      risperiDONE (RISPERDAL) 0.5 MG tablet Take 1 tablet by mouth nightly at bedtime.      levothyroxine (SYNTHROID) 25 MCG tablet Take 1 tablet by mouth daily      FLUoxetine (PROZAC) 40 MG capsule Take 2 capsules by mouth daily      FLUoxetine (PROZAC) 20 MG capsule Take 1 capsule by mouth daily      cetirizine (ZYRTEC) 10 MG tablet Take 1 tablet by mouth daily During allergy season      Prenatal Vit-Fe Fumarate-FA (PRENATAL 1 PLUS 1 PO) Take 1 tablet by mouth daily      Fluticasone Propionate (FLONASE NA) by Nasal route daily as needed (allergy)       No current

## 2025-07-03 ENCOUNTER — RESULTS FOLLOW-UP (OUTPATIENT)
Dept: FAMILY MEDICINE CLINIC | Age: 35
End: 2025-07-03